# Patient Record
Sex: MALE | Race: BLACK OR AFRICAN AMERICAN | NOT HISPANIC OR LATINO | URBAN - METROPOLITAN AREA
[De-identification: names, ages, dates, MRNs, and addresses within clinical notes are randomized per-mention and may not be internally consistent; named-entity substitution may affect disease eponyms.]

---

## 2023-07-12 ENCOUNTER — EMERGENCY (EMERGENCY)
Facility: HOSPITAL | Age: 38
LOS: 1 days | Discharge: HOME | End: 2023-07-12
Admitting: EMERGENCY MEDICINE
Payer: OTHER MISCELLANEOUS

## 2023-07-12 VITALS
TEMPERATURE: 98 F | HEART RATE: 67 BPM | DIASTOLIC BLOOD PRESSURE: 90 MMHG | OXYGEN SATURATION: 98 % | WEIGHT: 154.98 LBS | SYSTOLIC BLOOD PRESSURE: 137 MMHG | RESPIRATION RATE: 19 BRPM
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
## 2023-07-12 DIAGNOSIS — Z86.79 PERSONAL HISTORY OF OTHER DISEASES OF THE CIRCULATORY SYSTEM: ICD-10-CM
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
## 2023-07-12 DIAGNOSIS — Z88.0 ALLERGY STATUS TO PENICILLIN: ICD-10-CM

## 2023-07-12 DIAGNOSIS — S67.21XA CRUSHING INJURY OF RIGHT HAND, INITIAL ENCOUNTER: ICD-10-CM
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
## 2023-07-12 DIAGNOSIS — S62.302A UNSPECIFIED FRACTURE OF THIRD METACARPAL BONE, RIGHT HAND, INITIAL ENCOUNTER FOR CLOSED FRACTURE: ICD-10-CM

## 2023-07-12 DIAGNOSIS — M79.641 PAIN IN RIGHT HAND: ICD-10-CM

## 2023-07-12 PROCEDURE — 73130 X-RAY EXAM OF HAND: CPT | Mod: 26,RT

## 2023-07-12 PROCEDURE — 29125 APPL SHORT ARM SPLINT STATIC: CPT

## 2023-07-12 PROCEDURE — 99284 EMERGENCY DEPT VISIT MOD MDM: CPT | Mod: 25

## 2023-07-12 RX ORDER — ACETAMINOPHEN 500 MG
975 TABLET ORAL ONCE
Refills: 0 | Status: COMPLETED | OUTPATIENT
Start: 2023-07-12 | End: 2023-07-12

## 2023-07-12 RX ORDER — ACETAMINOPHEN 500 MG
2 TABLET ORAL
Qty: 20 | Refills: 0
Start: 2023-07-12

## 2023-07-12 RX ORDER — BACITRACIN ZINC 500 UNIT/G
1 OINTMENT IN PACKET (EA) TOPICAL ONCE
Refills: 0 | Status: COMPLETED | OUTPATIENT
Start: 2023-07-12 | End: 2023-07-12

## 2023-07-12 RX ORDER — IBUPROFEN 200 MG
1 TABLET ORAL
Qty: 20 | Refills: 0
Start: 2023-07-12

## 2023-07-12 RX ADMIN — Medication 500 MILLIGRAM(S): at 02:34

## 2023-07-12 RX ADMIN — Medication 1 APPLICATION(S): at 02:34

## 2023-07-12 RX ADMIN — Medication 975 MILLIGRAM(S): at 02:34

## 2023-07-12 NOTE — ED PROVIDER NOTE - LAB INTERPRETATION
Xray (wet reads) interpreted by JOSE MARTINEZ. Based on my own personal interpretation of the images,   xray hand - +soft tissue swelling with possible 3rd MCP non displaced fx. no acute dislocation, joint space intact, no effusion noted. No foreign body noted

## 2023-07-12 NOTE — ED PROVIDER NOTE - PATIENT PORTAL LINK FT
You can access the FollowMyHealth Patient Portal offered by [facility] by registering at the following website: [email or web address]. By joining [facility]’s FollowMyHealth portal, you will also be able to view your health information using other applications (apps) compatible with our system.

## 2023-07-12 NOTE — ED PROVIDER NOTE - WR ORDER STATUS 1
Head; normocephalic, atraumatic.  Mouth and Throat; Oral cavity and pharynx appearance normal. Performed

## 2023-07-12 NOTE — ED PROVIDER NOTE - PHYSICAL EXAMINATION
Gen - WDWN, NAD, speaking in full sentences  Skin - no acute rash, intact   HEENT - AT/NC, no conjunctival injection or dc, neck supple and FROM, airway patent   CV - S1S2, R/R/R  Resp - CTAB, no focal r/r/w   MSK - R hand +edema with mild overlying abrasion to the MCP region and TTP over 3rd to 5th MCP region, no erythema, ecchymosis, crepitus, joint laxity, or deformity, restricted ROM 2/2 pain, NV intact. +SILT, symmetric palpable distal pulses b/l, negative snuff box tenderness, FROM of all other peripheral joints   Psych - euphoria, normal speech and eye contact, judgement/insight intact   Neuro - AxOx3, ambulatory with steady gait

## 2023-07-12 NOTE — ED PROVIDER NOTE - PROVIDER TOKENS
PROVIDER:[TOKEN:[[id number]]],FREE:[LAST:[your PMD/orthopedist],PHONE:[(   )    -],FAX:[(   )    -]]

## 2023-07-12 NOTE — ED ADULT TRIAGE NOTE - CHIEF COMPLAINT QUOTE
BIBEMS for right hand pain and abrasion s/p metal dumpster fell on hand. swelling noted to right hand. tdap up to date

## 2023-07-12 NOTE — ED PROVIDER NOTE - NSFOLLOWUPINSTRUCTIONS_ED_ALL_ED_FT
Crush Injury of the Hand  When a crush injury of the hand occurs, many structures within the hand and wrist can be affected. This can result in a complicated injury that may involve:  One or more broken (fractured) bones.  Lacerations or abrasions of the skin. These increase your risk of infection.  Compressed or torn muscles.  Torn ligaments and tendons.  Broken blood vessels, causing bleeding within the tissues. This can lead to dangerously high pressure within the tissues (compartment syndrome).  Damage to nerves.  One or more finger amputations.  What are the causes?  This type of injury can happen when a great amount of force is suddenly applied to the hand. This might occur:  During a motor vehicle accident.  If a heavy load falls directly onto the hand.  If the hand is pulled into a machine during industrial or agricultural work.  What are the signs or symptoms?  Symptoms will vary depending on which structures in your hand have been injured. Symptoms may include:  Moderate or severe pain in the hand, wrist, or arm.  Bleeding at the site of injury.  Tingling, numbness, or loss of feeling (sensation) in part or all of your hand.  Loss of movement in part or all of your hand.  How is this diagnosed?  Your health care provider will examine you and ask questions about how your injury happened. The exam may include checking for sensation and blood flow into your hand. You may also have tests, including X-rays and procedures to check the pressure in your hand.    After initial treatment, additional tests may be done to further diagnose the extent of your injuries. These may include:  A nerve conduction study to determine how well the nerves are working in your arm and hand.  An MRI to determine if other injuries occurred that do not usually show up on an X-ray.  How is this treated?  Treatment for this condition depends on the severity of your crush injury. Treatment may include:  A thorough cleaning if you have an open wound. This may or may not require surgery.  Having a splint applied to your fingers, hand, or forearm.  Medicine to relieve pain.  Antibiotic medicine to prevent infection.  Stitches (sutures) to close open wounds.  One or more surgeries to address injuries to skin, bones, joints, tendons, ligaments, muscles, nerves, or blood vessels.  Follow these instructions at home:  If you have a splint:    Wear the splint as told by your health care provider. Remove it only as told by your health care provider.  Do not put pressure on any part of the splint until it is fully hardened. This may take several hours.  Loosen the splint if your fingers tingle, become numb, or turn cold and blue.  Keep the splint clean.  If the splint is not waterproof:  Do not let it get wet.  Cover it with a watertight covering when you take a bath or shower.  Wound care    Two wounds closed with skin glue. One is normal. The other is red with pus and infected.   If you have any skin wounds that were covered with bandages (dressings), follow instructions from your health care provider about how to take care of your wound. Make sure you:  Wash your hands with soap and water before and after you change your dressing. If soap and water are not available, use hand .  Change your dressing as told by your health care provider.  Leave stitches (sutures), skin glue, or adhesive strips in place. These skin closures may need to stay in place for 2 weeks or longer. If adhesive strip edges start to loosen and curl up, you may trim the loose edges. Do not remove adhesive strips completely unless your health care provider tells you to do that.  If you have skin wounds, check them every day for signs of infection. Check for:  More redness, swelling, or pain.  More fluid or blood.  Warmth.  Pus or a bad smell.  Managing pain, stiffness, and swelling    A bag of ice on a towel on the skin.  If directed, put ice on the injured area.  Put ice in a plastic bag.  Place a towel between your skin and the bag.  Leave the ice on for 20 minutes, 2–3 times a day.  Raise (elevate) the injured area above the level of your heart while you are sitting or lying down.  Driving    Ask your health care provider:  If the medicine prescribed to you requires you to avoid driving or using heavy machinery.  When it is safe to drive if you have a splint on your hand or arm.  Activity    Return to your normal activities as told by your health care provider. Ask your health care provider what activities are safe for you.  Work with a physical therapist (PT) or occupational therapist (OT) as told by your health care provider.  General instructions    Take over-the-counter and prescription medicines only as told by your health care provider.  If you were prescribed an antibiotic, take it as told by your health care provider. Do not stop taking the antibiotic even if you start to feel better.  Do not use any products that contain nicotine or tobacco, such as cigarettes, e-cigarettes, and chewing tobacco. These can delay healing. If you need help quitting, ask your health care provider.  Keep all follow-up visits as told by your health care provider. This is important. These include PT and OT visits.  Contact a health care provider if:  A wound that was sutured opens up.  You have more redness, swelling, or pain in your hand.  You have more fluid or blood coming from your hand.  Your hand feels warm to the touch.  You have pus or a bad smell coming from your hand.  You have a fever.  Get help right away if:  You suddenly develop severe pain in your hand.  You previously had sensation in your hand and you suddenly lose sensation.  Your wrist or hand becomes bent (contracted)involuntarily.  Your symptoms had improved and they suddenly get worse.  Your hand or fingers are turning pink or blue.  Summary  When a crush injury of the hand occurs, many structures within the hand and wrist can be affected.  Symptoms will vary depending on which structures in your hand have been injured.  Treatment for this condition depends on the severity of your crush injury. Crush Injury of the Hand  When a crush injury of the hand occurs, many structures within the hand and wrist can be affected. This can result in a complicated injury that may involve:  One or more broken (fractured) bones.  Lacerations or abrasions of the skin. These increase your risk of infection.  Compressed or torn muscles.  Torn ligaments and tendons.  Broken blood vessels, causing bleeding within the tissues. This can lead to dangerously high pressure within the tissues (compartment syndrome).  Damage to nerves.  One or more finger amputations.  What are the causes?  This type of injury can happen when a great amount of force is suddenly applied to the hand. This might occur:  During a motor vehicle accident.  If a heavy load falls directly onto the hand.  If the hand is pulled into a machine during industrial or agricultural work.  What are the signs or symptoms?  Symptoms will vary depending on which structures in your hand have been injured. Symptoms may include:  Moderate or severe pain in the hand, wrist, or arm.  Bleeding at the site of injury.  Tingling, numbness, or loss of feeling (sensation) in part or all of your hand.  Loss of movement in part or all of your hand.  How is this diagnosed?  Your health care provider will examine you and ask questions about how your injury happened. The exam may include checking for sensation and blood flow into your hand. You may also have tests, including X-rays and procedures to check the pressure in your hand.    After initial treatment, additional tests may be done to further diagnose the extent of your injuries. These may include:  A nerve conduction study to determine how well the nerves are working in your arm and hand.  An MRI to determine if other injuries occurred that do not usually show up on an X-ray.  How is this treated?  Treatment for this condition depends on the severity of your crush injury. Treatment may include:  A thorough cleaning if you have an open wound. This may or may not require surgery.  Having a splint applied to your fingers, hand, or forearm.  Medicine to relieve pain.  Antibiotic medicine to prevent infection.  Stitches (sutures) to close open wounds.  One or more surgeries to address injuries to skin, bones, joints, tendons, ligaments, muscles, nerves, or blood vessels.  Follow these instructions at home:  If you have a splint:    Wear the splint as told by your health care provider. Remove it only as told by your health care provider.  Do not put pressure on any part of the splint until it is fully hardened. This may take several hours.  Loosen the splint if your fingers tingle, become numb, or turn cold and blue.  Keep the splint clean.  If the splint is not waterproof:  Do not let it get wet.  Cover it with a watertight covering when you take a bath or shower.  Wound care    Two wounds closed with skin glue. One is normal. The other is red with pus and infected.   If you have any skin wounds that were covered with bandages (dressings), follow instructions from your health care provider about how to take care of your wound. Make sure you:  Wash your hands with soap and water before and after you change your dressing. If soap and water are not available, use hand .  Change your dressing as told by your health care provider.  Leave stitches (sutures), skin glue, or adhesive strips in place. These skin closures may need to stay in place for 2 weeks or longer. If adhesive strip edges start to loosen and curl up, you may trim the loose edges. Do not remove adhesive strips completely unless your health care provider tells you to do that.  If you have skin wounds, check them every day for signs of infection. Check for:  More redness, swelling, or pain.  More fluid or blood.  Warmth.  Pus or a bad smell.  Managing pain, stiffness, and swelling    A bag of ice on a towel on the skin.  If directed, put ice on the injured area.  Put ice in a plastic bag.  Place a towel between your skin and the bag.  Leave the ice on for 20 minutes, 2–3 times a day.  Raise (elevate) the injured area above the level of your heart while you are sitting or lying down.  Driving    Ask your health care provider:  If the medicine prescribed to you requires you to avoid driving or using heavy machinery.  When it is safe to drive if you have a splint on your hand or arm.  Activity    Return to your normal activities as told by your health care provider. Ask your health care provider what activities are safe for you.  Work with a physical therapist (PT) or occupational therapist (OT) as told by your health care provider.  General instructions    Take over-the-counter and prescription medicines only as told by your health care provider.  If you were prescribed an antibiotic, take it as told by your health care provider. Do not stop taking the antibiotic even if you start to feel better.  Do not use any products that contain nicotine or tobacco, such as cigarettes, e-cigarettes, and chewing tobacco. These can delay healing. If you need help quitting, ask your health care provider.  Keep all follow-up visits as told by your health care provider. This is important. These include PT and OT visits.  Contact a health care provider if:  A wound that was sutured opens up.  You have more redness, swelling, or pain in your hand.  You have more fluid or blood coming from your hand.  Your hand feels warm to the touch.  You have pus or a bad smell coming from your hand.  You have a fever.  Get help right away if:  You suddenly develop severe pain in your hand.  You previously had sensation in your hand and you suddenly lose sensation.  Your wrist or hand becomes bent (contracted)involuntarily.  Your symptoms had improved and they suddenly get worse.  Your hand or fingers are turning pink or blue.  Summary  When a crush injury of the hand occurs, many structures within the hand and wrist can be affected.  Symptoms will vary depending on which structures in your hand have been injured.  Treatment for this condition depends on the severity of your crush injury.    You have a possible fracture of the metacarpal bone of your right hand    A splint has been placed to temporarily immobilize and protect the injured area.      A splint is a temporary cast that   • allows for room for expected swelling   • reduces pain by protecting and supporting the injured area  • is NOT waterproofed  • should NOT be removed unless instructed to do so     FRACTURE CARE  • Swelling of the injured area is common during the first few days.  Elevate your splint above the level of your heart frequently to reduce swelling   • Apply ice covered with a thin towel to the splint for 20 minutes every 2 hours while awake to reduce swelling and pain   • Keep your splint clean and dry at all times.  If it becomes wet, dry it with a hair dryer ONLY on the COOL setting   • Do not apply powder or lotion on or near the splint   • Do not pull the padding out from inside your splint   • Do not place anything inside the splint, even for itchy areas.  Sticking items inside can injure the skin and lead to infection   • Do not put weight on injured site unless cleared by your provider     PLEASE SEEK MEDICAL ATTENTION OR RETURN TO ER IMMEDIATELY IF:  * You have severe pain or pain that is getting worse without relief from medications   * You have sores or cuts on the skin under the splint   * You are unable to move your fingers or toes   * Your fingers or toes are blue or cold   * Your splint becomes soaking wet or you are unable to dry it   * Your splint has foul odor, feels too tight, cracks, or becomes grossly soiled  * Fever >100.4F     *** PLEASE FOLLOW UP WITH ORTHOPEDIST (HAND SPECIALIST) IN 7 DAYS ***

## 2023-07-12 NOTE — ED PROVIDER NOTE - CCCP TRG CHIEF CMPLNT
hand pain/injury Tranexamic Acid Counseling:  Patient advised of the small risk of bleeding problems with tranexamic acid. They were also instructed to call if they developed any nausea, vomiting or diarrhea. All of the patient's questions and concerns were addressed.

## 2023-07-12 NOTE — ED PROVIDER NOTE - OBJECTIVE STATEMENT
36 yo M with no known PMHx, RHD, last tetanus approximately one yr ago, , presenting c/o R hand pain s/p crushed injury. Pt reports a dumpster container (approximately 40-50lbs) accidentally fell from approximately 3 ft height onto his R hand region while at work this morning. Sustained abrasion to the dorsum of the R hand with associated swelling, pain, restricted ROM. Denies head trauma, LOC, paresthesia, numbness, tingling, redness, bleeding, d/c, HA, dizziness, SOB, CP, palpitations, N/V, focal weakness, neck/back pain, fall, and malaise.

## 2023-07-12 NOTE — ED PROVIDER NOTE - CARE PROVIDER_API CALL
[facility]  [address] [place] [address]  Phone: [phone]  Fax: [phone]  Follow Up Time:     your PMD/orthopedist,   Phone: (   )    -  Fax: (   )    -  Follow Up Time:

## 2025-04-17 NOTE — ED ADULT NURSE NOTE - CHIEF COMPLAINT QUOTE
150mg of medroxyprogesterone administered IM to right ventrogluteal. Patient tolerated well. No pain or discomfort noted. Advised to wait 15 minutes after in clinic. Patient verbalized understanding. Next injection scheduled.     
BIBEMS for right hand pain and abrasion s/p metal dumpster fell on hand. swelling noted to right hand. tdap up to date